# Patient Record
Sex: FEMALE | Race: WHITE
[De-identification: names, ages, dates, MRNs, and addresses within clinical notes are randomized per-mention and may not be internally consistent; named-entity substitution may affect disease eponyms.]

---

## 2019-03-11 ENCOUNTER — HOSPITAL ENCOUNTER (OUTPATIENT)
Age: 69
End: 2019-03-11
Payer: MEDICARE

## 2019-03-11 DIAGNOSIS — M75.102: Primary | ICD-10-CM

## 2019-03-11 PROCEDURE — 73030 X-RAY EXAM OF SHOULDER: CPT

## 2022-01-25 ENCOUNTER — HOSPITAL ENCOUNTER (OUTPATIENT)
Age: 72
End: 2022-01-25
Payer: MEDICARE

## 2022-01-25 DIAGNOSIS — Z20.822: Primary | ICD-10-CM

## 2022-01-25 PROCEDURE — U0005 INFEC AGEN DETEC AMPLI PROBE: HCPCS

## 2022-01-25 PROCEDURE — U0003 INFECTIOUS AGENT DETECTION BY NUCLEIC ACID (DNA OR RNA); SEVERE ACUTE RESPIRATORY SYNDROME CORONAVIRUS 2 (SARS-COV-2) (CORONAVIRUS DISEASE [COVID-19]), AMPLIFIED PROBE TECHNIQUE, MAKING USE OF HIGH THROUGHPUT TECHNOLOGIES AS DESCRIBED BY CMS-2020-01-R: HCPCS

## 2022-01-25 PROCEDURE — C9803 HOPD COVID-19 SPEC COLLECT: HCPCS

## 2022-06-10 ENCOUNTER — HOSPITAL ENCOUNTER (EMERGENCY)
Age: 72
Discharge: HOME | End: 2022-06-10
Payer: MEDICARE

## 2022-06-10 VITALS
HEART RATE: 67 BPM | BODY MASS INDEX: 26.9 KG/M2 | DIASTOLIC BLOOD PRESSURE: 57 MMHG | SYSTOLIC BLOOD PRESSURE: 147 MMHG | TEMPERATURE: 98.06 F | OXYGEN SATURATION: 97 % | RESPIRATION RATE: 18 BRPM

## 2022-06-10 VITALS
OXYGEN SATURATION: 97 % | DIASTOLIC BLOOD PRESSURE: 57 MMHG | RESPIRATION RATE: 18 BRPM | TEMPERATURE: 98 F | HEART RATE: 67 BPM | SYSTOLIC BLOOD PRESSURE: 147 MMHG

## 2022-06-10 DIAGNOSIS — S61.232A: ICD-10-CM

## 2022-06-10 DIAGNOSIS — Z23: ICD-10-CM

## 2022-06-10 PROCEDURE — 99283 EMERGENCY DEPT VISIT LOW MDM: CPT

## 2022-06-10 PROCEDURE — 90471 IMMUNIZATION ADMIN: CPT

## 2022-06-10 PROCEDURE — 99212 OFFICE O/P EST SF 10 MIN: CPT

## 2022-06-10 PROCEDURE — 90715 TDAP VACCINE 7 YRS/> IM: CPT

## 2022-06-10 PROCEDURE — G0463 HOSPITAL OUTPT CLINIC VISIT: HCPCS

## 2022-09-23 ENCOUNTER — OFFICE VISIT (OUTPATIENT)
Dept: FAMILY MEDICINE CLINIC | Facility: CLINIC | Age: 72
End: 2022-09-23

## 2022-09-23 VITALS
DIASTOLIC BLOOD PRESSURE: 80 MMHG | HEART RATE: 69 BPM | BODY MASS INDEX: 27.55 KG/M2 | WEIGHT: 171.4 LBS | SYSTOLIC BLOOD PRESSURE: 130 MMHG | RESPIRATION RATE: 20 BRPM | HEIGHT: 66 IN | TEMPERATURE: 97.8 F | OXYGEN SATURATION: 98 %

## 2022-09-23 DIAGNOSIS — Z00.00 MEDICARE ANNUAL WELLNESS VISIT, SUBSEQUENT: Primary | ICD-10-CM

## 2022-09-23 DIAGNOSIS — Z12.11 COLON CANCER SCREENING: ICD-10-CM

## 2022-09-23 DIAGNOSIS — Z83.3 FAMILY HISTORY OF DIABETES MELLITUS: ICD-10-CM

## 2022-09-23 DIAGNOSIS — Z83.49 FAMILY HISTORY OF THYROID DISEASE IN FATHER: ICD-10-CM

## 2022-09-23 DIAGNOSIS — Z13.220 LIPID SCREENING: ICD-10-CM

## 2022-09-23 DIAGNOSIS — Z12.31 ENCOUNTER FOR SCREENING MAMMOGRAM FOR MALIGNANT NEOPLASM OF BREAST: ICD-10-CM

## 2022-09-23 PROCEDURE — 99397 PER PM REEVAL EST PAT 65+ YR: CPT | Performed by: FAMILY MEDICINE

## 2022-09-23 PROCEDURE — 1170F FXNL STATUS ASSESSED: CPT | Performed by: FAMILY MEDICINE

## 2022-09-23 PROCEDURE — G0439 PPPS, SUBSEQ VISIT: HCPCS | Performed by: FAMILY MEDICINE

## 2022-09-23 NOTE — PROGRESS NOTES
The ABCs of the Annual Wellness Visit  Subsequent Medicare Wellness Visit    Chief Complaint   Patient presents with   • NP / establish PCP    • Medicare Wellness-subsequent      Subjective    History of Present Illness:  Sarah Beth Mclain is a 72 y.o. female who presents for a Subsequent Medicare Wellness Visit.    The following portions of the patient's history were reviewed and   updated as appropriate: allergies, current medications, past family history, past medical history, past social history, past surgical history and problem list.    Compared to one year ago, the patient feels her physical   health is the same.    Compared to one year ago, the patient feels her mental   health is the same.    Recent Hospitalizations:  She was not admitted to the hospital during the last year.       Current Medical Providers:  Patient Care Team:  Aguilar Charlton MD as PCP - General (Family Medicine)    No outpatient medications prior to visit.     No facility-administered medications prior to visit.       No opioid medication identified on active medication list. I have reviewed chart for other potential  high risk medication/s and harmful drug interactions in the elderly.          Aspirin is not on active medication list.  Aspirin use is not indicated based on review of current medical condition/s. Risk of harm outweighs potential benefits.  .    There is no problem list on file for this patient.    Advance Care Planning  Advance Directive is not on file.  ACP discussion was held with the patient during this visit. Patient has an advance directive (not in EMR), copy requested.    Review of Systems   Constitutional: Negative.    HENT: Negative.    Eyes: Negative.    Respiratory: Negative.    Cardiovascular: Negative.    Gastrointestinal: Negative.    Endocrine: Negative.    Genitourinary: Negative.    Musculoskeletal: Negative.    Skin: Negative.    Allergic/Immunologic: Negative.    Neurological: Negative.   "  Hematological: Negative.    Psychiatric/Behavioral: Negative.         Objective    Vitals:    09/23/22 1422   BP: 130/80   Pulse: 69   Resp: 20   Temp: 97.8 °F (36.6 °C)   SpO2: 98%   Weight: 77.7 kg (171 lb 6.4 oz)   Height: 168.3 cm (66.25\")     Estimated body mass index is 27.46 kg/m² as calculated from the following:    Height as of this encounter: 168.3 cm (66.25\").    Weight as of this encounter: 77.7 kg (171 lb 6.4 oz).    BMI is >= 25 and <30. (Overweight) The following options were offered after discussion;: nutrition counseling/recommendations      Does the patient have evidence of cognitive impairment? No    Physical Exam  Constitutional:       Appearance: Normal appearance.   HENT:      Head: Normocephalic and atraumatic.      Right Ear: Tympanic membrane and ear canal normal.      Left Ear: Tympanic membrane and ear canal normal.      Nose: Nose normal.      Mouth/Throat:      Mouth: Mucous membranes are moist.      Pharynx: Oropharynx is clear.   Eyes:      Conjunctiva/sclera: Conjunctivae normal.   Cardiovascular:      Rate and Rhythm: Normal rate and regular rhythm.      Heart sounds: Normal heart sounds. No murmur heard.  Pulmonary:      Effort: Pulmonary effort is normal. No respiratory distress.      Breath sounds: Normal breath sounds.   Abdominal:      General: Abdomen is flat. Bowel sounds are normal. There is no distension.      Palpations: Abdomen is soft.      Tenderness: There is no abdominal tenderness.   Musculoskeletal:         General: Normal range of motion.      Cervical back: Normal range of motion and neck supple. No tenderness.   Lymphadenopathy:      Cervical: No cervical adenopathy.   Skin:     General: Skin is warm and dry.   Neurological:      General: No focal deficit present.      Mental Status: She is alert.   Psychiatric:         Mood and Affect: Mood normal.                 HEALTH RISK ASSESSMENT    Smoking Status:  Social History     Tobacco Use   Smoking Status Never " Smoker   Smokeless Tobacco Never Used     Alcohol Consumption:  Social History     Substance and Sexual Activity   Alcohol Use Yes   • Alcohol/week: 1.0 standard drink   • Types: 1 Glasses of wine per week     Fall Risk Screen:    REBEKAH Fall Risk Assessment was completed, and patient is at LOW risk for falls.Assessment completed on:9/23/2022    Depression Screening:  PHQ-2/PHQ-9 Depression Screening 9/23/2022   Little Interest or Pleasure in Doing Things 0-->not at all   Feeling Down, Depressed or Hopeless 0-->not at all   PHQ-9: Brief Depression Severity Measure Score 0       Health Habits and Functional and Cognitive Screening:  Functional & Cognitive Status 9/23/2022   Do you have difficulty preparing food and eating? No   Do you have difficulty bathing yourself, getting dressed or grooming yourself? No   Do you have difficulty using the toilet? No   Do you have difficulty moving around from place to place? No   Do you have trouble with steps or getting out of a bed or a chair? No   Current Diet Well Balanced Diet   Dental Exam Up to date   Eye Exam Up to date   Exercise (times per week) 5 times per week   Current Exercises Include Walking   Do you need help using the phone?  No   Are you deaf or do you have serious difficulty hearing?  No   Do you need help with transportation? No   Do you need help shopping? No   Do you need help preparing meals?  No   Do you need help with housework?  No   Do you need help with laundry? No   Do you need help taking your medications? No   Do you need help managing money? No   Do you ever drive or ride in a car without wearing a seat belt? No   Have you felt unusual stress, anger or loneliness in the last month? No   Who do you live with? Alone   If you need help, do you have trouble finding someone available to you? No   Do you have difficulty concentrating, remembering or making decisions? No       Age-appropriate Screening Schedule:  Refer to the list below for future  screening recommendations based on patient's age, sex and/or medical conditions. Orders for these recommended tests are listed in the plan section. The patient has been provided with a written plan.    Health Maintenance   Topic Date Due   • DXA SCAN  Never done   • INFLUENZA VACCINE  10/01/2022   • MAMMOGRAM  11/01/2023   • TDAP/TD VACCINES (2 - Td or Tdap) 06/10/2032   • ZOSTER VACCINE  Completed              Assessment & Plan   CMS Preventative Services Quick Reference  Risk Factors Identified During Encounter  Immunizations Discussed/Encouraged (specific Immunizations; Influenza and COVID19  The above risks/problems have been discussed with the patient.  Follow up actions/plans if indicated are seen below in the Assessment/Plan Section.  Pertinent information has been shared with the patient in the After Visit Summary.    Diagnoses and all orders for this visit:    1. Medicare annual wellness visit, subsequent (Primary)    2. Colon cancer screening  -     Cologuard - Stool, Per Rectum; Future    3. Encounter for screening mammogram for malignant neoplasm of breast  -     Mammo Screening Digital Tomosynthesis Bilateral With CAD; Future    4. Lipid screening  -     Lipid Panel    5. Family history of diabetes mellitus  -     Basic Metabolic Panel    6. Family history of thyroid disease in father  -     TSH Rfx On Abnormal To Free T4    Personalized care plan:  1.  Flu and divalent COVID booster should be obtained this fall  2.  CRC screening with Cologuard has been ordered  3.  Mammogram ordered for November  4.  Screening lipid ordered along with BMP to obtain glucose and TSH due to family history of diabetes and hypothyroidism respectively    Follow Up:   Return in about 1 year (around 9/23/2023) for Annual, Medicare Wellness.     An After Visit Summary and PPPS were made available to the patient.

## 2022-09-24 LAB
BUN SERPL-MCNC: 19 MG/DL (ref 8–27)
BUN/CREAT SERPL: 27 (ref 12–28)
CALCIUM SERPL-MCNC: 9.7 MG/DL (ref 8.7–10.3)
CHLORIDE SERPL-SCNC: 101 MMOL/L (ref 96–106)
CHOLEST SERPL-MCNC: 169 MG/DL (ref 100–199)
CO2 SERPL-SCNC: 22 MMOL/L (ref 20–29)
CREAT SERPL-MCNC: 0.7 MG/DL (ref 0.57–1)
EGFRCR SERPLBLD CKD-EPI 2021: 92 ML/MIN/1.73
GLUCOSE SERPL-MCNC: 87 MG/DL (ref 65–99)
HDLC SERPL-MCNC: 49 MG/DL
LDLC SERPL CALC-MCNC: 94 MG/DL (ref 0–99)
POTASSIUM SERPL-SCNC: 4.2 MMOL/L (ref 3.5–5.2)
SODIUM SERPL-SCNC: 139 MMOL/L (ref 134–144)
TRIGL SERPL-MCNC: 149 MG/DL (ref 0–149)
TSH SERPL DL<=0.005 MIU/L-ACNC: 3.42 UIU/ML (ref 0.45–4.5)
VLDLC SERPL CALC-MCNC: 26 MG/DL (ref 5–40)

## 2022-10-25 ENCOUNTER — OFFICE VISIT (OUTPATIENT)
Dept: FAMILY MEDICINE CLINIC | Facility: CLINIC | Age: 72
End: 2022-10-25

## 2022-10-25 VITALS
OXYGEN SATURATION: 98 % | WEIGHT: 170.4 LBS | BODY MASS INDEX: 27.3 KG/M2 | HEART RATE: 77 BPM | TEMPERATURE: 97.5 F | DIASTOLIC BLOOD PRESSURE: 70 MMHG | SYSTOLIC BLOOD PRESSURE: 130 MMHG

## 2022-10-25 DIAGNOSIS — H69.82 ACUTE DYSFUNCTION OF LEFT EUSTACHIAN TUBE: Primary | ICD-10-CM

## 2022-10-25 PROCEDURE — 99212 OFFICE O/P EST SF 10 MIN: CPT | Performed by: FAMILY MEDICINE
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
## 2022-12-06 ENCOUNTER — TELEPHONE (OUTPATIENT)
Dept: FAMILY MEDICINE CLINIC | Facility: CLINIC | Age: 72
End: 2022-12-06

## 2022-12-06 NOTE — TELEPHONE ENCOUNTER
[facility] ([phone], reference #[id number]) needs a diagnose code. They would like a call back.

## 2023-01-03 ENCOUNTER — HOSPITAL ENCOUNTER (EMERGENCY)
Age: 73
Discharge: HOME | End: 2023-01-03
Payer: MEDICARE

## 2023-01-03 VITALS
SYSTOLIC BLOOD PRESSURE: 139 MMHG | TEMPERATURE: 98.78 F | RESPIRATION RATE: 16 BRPM | DIASTOLIC BLOOD PRESSURE: 83 MMHG | HEART RATE: 66 BPM | OXYGEN SATURATION: 96 %

## 2023-01-03 VITALS
TEMPERATURE: 98.8 F | SYSTOLIC BLOOD PRESSURE: 157 MMHG | OXYGEN SATURATION: 99 % | DIASTOLIC BLOOD PRESSURE: 77 MMHG | RESPIRATION RATE: 16 BRPM | HEART RATE: 88 BPM

## 2023-01-03 VITALS — BODY MASS INDEX: 25.8 KG/M2

## 2023-01-03 DIAGNOSIS — R10.10: ICD-10-CM

## 2023-01-03 DIAGNOSIS — R10.13: ICD-10-CM

## 2023-01-03 DIAGNOSIS — R07.89: ICD-10-CM

## 2023-01-03 DIAGNOSIS — K29.70: Primary | ICD-10-CM

## 2023-01-03 LAB
ALBUMIN LEVEL: 4.8 G/DL (ref 3.5–5)
ALBUMIN/GLOB SERPL: 1.5 {RATIO} (ref 1.1–1.8)
ALP ISO SERPL-ACNC: 64 U/L (ref 38–126)
ALT SERPLBLD-CCNC: 22 U/L (ref 12–78)
ANION GAP SERPL CALC-SCNC: 13.8 MEQ/L (ref 5–15)
AST SERPL QL: 29 U/L (ref 14–36)
BILIRUBIN,TOTAL: 0.5 MG/DL (ref 0.2–1.3)
BUN SERPL-MCNC: 22 MG/DL (ref 7–17)
CALCIUM SPEC-MCNC: 9.1 MG/DL (ref 8.4–10.2)
CHLORIDE SPEC-SCNC: 105 MMOL/L (ref 98–107)
CO2 SERPL-SCNC: 25 MMOL/L (ref 22–30)
COLOR UR: YELLOW
CREAT BLD-SCNC: 0.6 MG/DL (ref 0.52–1.04)
CREATININE CLEARANCE ESTIMATED: 59 ML/MIN (ref 50–200)
ESTIMATED GLOMERULAR FILT RATE: 99 ML/MIN (ref 60–?)
GFR (AFRICAN AMERICAN): 119 ML/MIN (ref 60–?)
GLOBULIN SER CALC-MCNC: 3.2 G/DL (ref 1.3–3.2)
GLUCOSE: 142 MG/DL (ref 74–100)
HCT VFR BLD CALC: 45.2 % (ref 37–47)
HGB BLD-MCNC: 15 G/DL (ref 12.2–16.2)
KETONES UR STRIP.AUTO-MCNC: (no result) MG/DL
LIPASE: 146 U/L (ref 23–300)
MCHC RBC-ENTMCNC: 33.2 G/DL (ref 31.8–35.4)
MCV RBC: 85.5 FL (ref 81–99)
MEAN CORPUSCULAR HEMOGLOBIN: 28.4 PG (ref 27–31.2)
MICRO URNS: (no result)
PH UR: 6 [PH] (ref 5–8.5)
PLATELET # BLD: 355 K/MM3 (ref 142–424)
POTASSIUM: 3.8 MMOL/L (ref 3.5–5.1)
PROT SERPL-MCNC: 8 G/DL (ref 6.3–8.2)
RBC # BLD AUTO: 5.28 M/MM3 (ref 4.2–5.4)
SODIUM SPEC-SCNC: 140 MMOL/L (ref 136–145)
SP GR UR: 1.02 (ref 1–1.03)
TROPONIN I: < 0.01 NG/ML (ref 0–0.03)
UROBILINOGEN UR QL: 0.2 EU/DL
WBC # BLD AUTO: 12.7 K/MM3 (ref 4.8–10.8)
WBC # UR: (no result) #/HPF (ref 0–3)

## 2023-01-03 PROCEDURE — 85025 COMPLETE CBC W/AUTO DIFF WBC: CPT

## 2023-01-03 PROCEDURE — 83690 ASSAY OF LIPASE: CPT

## 2023-01-03 PROCEDURE — 74177 CT ABD & PELVIS W/CONTRAST: CPT

## 2023-01-03 PROCEDURE — 96374 THER/PROPH/DIAG INJ IV PUSH: CPT

## 2023-01-03 PROCEDURE — 84484 ASSAY OF TROPONIN QUANT: CPT

## 2023-01-03 PROCEDURE — 76705 ECHO EXAM OF ABDOMEN: CPT

## 2023-01-03 PROCEDURE — 80053 COMPREHEN METABOLIC PANEL: CPT

## 2023-01-03 PROCEDURE — 99285 EMERGENCY DEPT VISIT HI MDM: CPT

## 2023-01-03 PROCEDURE — 83605 ASSAY OF LACTIC ACID: CPT

## 2023-01-03 PROCEDURE — 81001 URINALYSIS AUTO W/SCOPE: CPT

## 2023-01-03 PROCEDURE — 96361 HYDRATE IV INFUSION ADD-ON: CPT

## 2023-01-03 PROCEDURE — 93005 ELECTROCARDIOGRAM TRACING: CPT

## 2023-01-03 PROCEDURE — 96375 TX/PRO/DX INJ NEW DRUG ADDON: CPT

## 2023-01-03 NOTE — PC.NURSE
checked on pt at this time. pt states no needs at this time, lights turned off in room for comfort, pt states is tired.

## 2023-01-19 ENCOUNTER — OFFICE VISIT (OUTPATIENT)
Dept: FAMILY MEDICINE CLINIC | Facility: CLINIC | Age: 73
End: 2023-01-19
Payer: MEDICARE

## 2023-01-19 VITALS
HEART RATE: 78 BPM | SYSTOLIC BLOOD PRESSURE: 146 MMHG | DIASTOLIC BLOOD PRESSURE: 82 MMHG | WEIGHT: 172 LBS | OXYGEN SATURATION: 98 % | BODY MASS INDEX: 27 KG/M2 | TEMPERATURE: 97.5 F | HEIGHT: 67 IN | RESPIRATION RATE: 18 BRPM

## 2023-01-19 DIAGNOSIS — R73.9 ELEVATED BLOOD SUGAR LEVEL: ICD-10-CM

## 2023-01-19 DIAGNOSIS — K21.9 GASTROESOPHAGEAL REFLUX DISEASE WITHOUT ESOPHAGITIS: Primary | ICD-10-CM

## 2023-01-19 LAB — GLUCOSE BLDC GLUCOMTR-MCNC: 97 MG/DL (ref 70–130)

## 2023-01-19 PROCEDURE — 99213 OFFICE O/P EST LOW 20 MIN: CPT | Performed by: FAMILY MEDICINE

## 2023-01-19 PROCEDURE — 82962 GLUCOSE BLOOD TEST: CPT | Performed by: FAMILY MEDICINE

## 2023-01-19 RX ORDER — PANTOPRAZOLE SODIUM 40 MG/1
40 TABLET, DELAYED RELEASE ORAL DAILY
COMMUNITY
Start: 2023-01-03 | End: 2023-01-19 | Stop reason: SDUPTHER

## 2023-01-19 RX ORDER — PANTOPRAZOLE SODIUM 40 MG/1
40 TABLET, DELAYED RELEASE ORAL DAILY
Qty: 30 TABLET | Refills: 8 | Status: SHIPPED | OUTPATIENT
Start: 2023-01-19

## 2023-01-19 NOTE — ASSESSMENT & PLAN NOTE
Pantoprazole daily for 3 months then may change to as needed use.  If symptoms recur while on maintenance therapy she would need EGD

## 2023-01-19 NOTE — PROGRESS NOTES
"Chief Complaint   Patient presents with   • ER f/u     Was in the ER on 3rd. Dx Gastritis       Subjective      Sarah Beth Mclain is a 72 y.o. who presents for follow up of an ER visit on 1/3/23 when she presented with epigastric discomfort and though she was having an MI. She also reports she was having frequent nocturnal awakenings with \"bile in my throat\". Symptoms resolved with a a GI cocktail. She ruled out for MI. She is now taking protonix and symptoms have resolved.  She tells me she was told she had elevated blood sugar of 140 in the emergency room.  That was a fasting result.  Patient's white blood cell count is also apparently slightly elevated.    Objective   Vital Signs:  /82   Pulse 78   Temp 97.5 °F (36.4 °C)   Resp 18   Ht 170.2 cm (67\")   Wt 78 kg (172 lb)   SpO2 98%   BMI 26.94 kg/m²     Physical Exam  Vitals reviewed.   Constitutional:       Appearance: Normal appearance.   Neurological:      Mental Status: She is alert.          Result Review   The following data was reviewed by: Aguilar Charlton MD on 01/19/2023:  Common labs    Common Labs 9/23/22 9/23/22    1459 1459   Glucose  87   BUN  19   Creatinine  0.70   Sodium  139   Potassium  4.2   Chloride  101   Calcium  9.7   Total Cholesterol 169    Triglycerides 149    HDL Cholesterol 49    LDL Cholesterol  94       Comments are available for some flowsheets but are not being displayed.           Data reviewed: Glucose 97              Assessment and Plan  Diagnoses and all orders for this visit:    1. Gastroesophageal reflux disease without esophagitis (Primary)  Assessment & Plan:  Pantoprazole daily for 3 months then may change to as needed use.  If symptoms recur while on maintenance therapy she would need EGD    Orders:  -     pantoprazole (PROTONIX) 40 MG EC tablet; Take 1 tablet by mouth Daily.  Dispense: 30 tablet; Refill: 8    2. Elevated blood sugar level  Comments:  Elevated blood sugar in ER likely " stress-induced.  Orders:  -     POC Glucose          Follow Up  No follow-ups on file.  Patient was given instructions and counseling regarding her condition or for health maintenance advice. Please see specific information pulled into the AVS if appropriate.

## 2023-02-01 ENCOUNTER — TELEPHONE (OUTPATIENT)
Dept: FAMILY MEDICINE CLINIC | Facility: CLINIC | Age: 73
End: 2023-02-01
Payer: MEDICARE

## 2023-02-01 NOTE — TELEPHONE ENCOUNTER
" Informed pt about taking Mucinex, Delsym and Tylenol vs Ibuprofen and just treating her symptoms as they arise. She stated, \"she has no underlying health issues other than her age.\" She was informed to go to the ER if any chest pain or difficulty breathing develops. She voiced understanding.     Pt want to know if you think she needs Paxlovide?  "

## 2023-02-01 NOTE — TELEPHONE ENCOUNTER
Caller: Sarah Beth Mclain    Relationship to patient: Self    Best call back number: [phone]    Date of positive COVID19 test: 2/1/23    COVID19 symptoms: SINUS DRAINAGE, COUGH, SORE THROAT     Additional information or concerns: PATIENT IS ASKING FOR MEDICATION     What is the patients preferred pharmacy: [facility] IN [place]

## 2023-02-01 NOTE — TELEPHONE ENCOUNTER
Because she is in good health, vaccinated  and symptoms are mild I think we can hold off on medication. I would like an update on Thursday. We can always start medicine if she begins feeling worse

## 2023-02-03 ENCOUNTER — TELEPHONE (OUTPATIENT)
Dept: FAMILY MEDICINE CLINIC | Facility: CLINIC | Age: 73
End: 2023-02-03
Payer: MEDICARE

## 2023-02-03 NOTE — TELEPHONE ENCOUNTER
Caller: Sarah Beth Mclain    Relationship: Self    Best call back number: [phone]    Who are you requesting to speak with (clinical staff, provider,  specific staff member): CLINICAL     What was the call regarding: COVID UPDATE     Do you require a callback: YES

## 2023-09-25 ENCOUNTER — OFFICE VISIT (OUTPATIENT)
Dept: FAMILY MEDICINE CLINIC | Facility: CLINIC | Age: 73
End: 2023-09-25

## 2023-09-25 VITALS
HEIGHT: 67 IN | TEMPERATURE: 98 F | BODY MASS INDEX: 27.44 KG/M2 | WEIGHT: 174.8 LBS | OXYGEN SATURATION: 98 % | DIASTOLIC BLOOD PRESSURE: 62 MMHG | SYSTOLIC BLOOD PRESSURE: 148 MMHG | HEART RATE: 61 BPM | RESPIRATION RATE: 18 BRPM

## 2023-09-25 DIAGNOSIS — Z00.00 ANNUAL PHYSICAL EXAM: ICD-10-CM

## 2023-09-25 DIAGNOSIS — Z23 NEED FOR VACCINATION: ICD-10-CM

## 2023-09-25 DIAGNOSIS — K21.9 GASTROESOPHAGEAL REFLUX DISEASE WITHOUT ESOPHAGITIS: ICD-10-CM

## 2023-09-25 DIAGNOSIS — Z00.00 MEDICARE ANNUAL WELLNESS VISIT, SUBSEQUENT: Primary | ICD-10-CM

## 2023-09-25 PROCEDURE — 90662 IIV NO PRSV INCREASED AG IM: CPT | Performed by: FAMILY MEDICINE

## 2023-09-25 PROCEDURE — 1170F FXNL STATUS ASSESSED: CPT | Performed by: FAMILY MEDICINE

## 2023-09-25 PROCEDURE — G0008 ADMIN INFLUENZA VIRUS VAC: HCPCS | Performed by: FAMILY MEDICINE

## 2023-09-25 PROCEDURE — 1160F RVW MEDS BY RX/DR IN RCRD: CPT | Performed by: FAMILY MEDICINE

## 2023-09-25 PROCEDURE — 99397 PER PM REEVAL EST PAT 65+ YR: CPT | Performed by: FAMILY MEDICINE

## 2023-09-25 PROCEDURE — G0439 PPPS, SUBSEQ VISIT: HCPCS | Performed by: FAMILY MEDICINE

## 2023-09-25 PROCEDURE — 1159F MED LIST DOCD IN RCRD: CPT | Performed by: FAMILY MEDICINE

## 2023-09-25 RX ORDER — PANTOPRAZOLE SODIUM 40 MG/1
40 TABLET, DELAYED RELEASE ORAL DAILY
Qty: 30 TABLET | Refills: 11 | Status: SHIPPED | OUTPATIENT
Start: 2023-09-25

## 2023-09-25 NOTE — PROGRESS NOTES
The ABCs of the Annual Wellness Visit  Subsequent Medicare Wellness Visit    Subjective      Sarah Beht Mclain is a 73 y.o. female who presents for a Subsequent Medicare Wellness Visit and annual physical.    The following portions of the patient's history were reviewed and   updated as appropriate: allergies, current medications, past family history, past medical history, past social history, past surgical history, and problem list.    Compared to one year ago, the patient feels her physical   health is the same.    Compared to one year ago, the patient feels her mental   health is the same.    Recent Hospitalizations:  She was not admitted to the hospital during the last year.       Current Medical Providers:  Patient Care Team:  Aguilar Charlton MD as PCP - General (Family Medicine)    Outpatient Medications Prior to Visit   Medication Sig Dispense Refill    pantoprazole (PROTONIX) 40 MG EC tablet Take 1 tablet by mouth Daily. 30 tablet 8     No facility-administered medications prior to visit.       No opioid medication identified on active medication list. I have reviewed chart for other potential  high risk medication/s and harmful drug interactions in the elderly.        Aspirin is not on active medication list.  Aspirin use is not indicated based on review of current medical condition/s. Risk of harm outweighs potential benefits.  .    Patient Active Problem List   Diagnosis    Gastroesophageal reflux disease without esophagitis     Advance Care Planning   Advance Care Planning     Advance Directive is on file.  ACP discussion was held with the patient during this visit. Patient has an advance directive in EMR which is still valid.        Review of Systems   Constitutional: Negative.    HENT: Negative.     Eyes: Negative.    Respiratory: Negative.     Cardiovascular: Negative.    Gastrointestinal:         Occasional heartburn   Endocrine: Negative.    Genitourinary: Negative.    Musculoskeletal:  "Negative.    Skin: Negative.    Allergic/Immunologic: Negative.    Neurological: Negative.    Hematological: Negative.    Psychiatric/Behavioral: Negative.           Objective    Vitals:    09/25/23 1004   BP: 148/62   Pulse: 61   Resp: 18   Temp: 98 °F (36.7 °C)   SpO2: 98%   Weight: 79.3 kg (174 lb 12.8 oz)   Height: 170.2 cm (67\")   PainSc: 0-No pain     Estimated body mass index is 27.38 kg/m² as calculated from the following:    Height as of this encounter: 170.2 cm (67\").    Weight as of this encounter: 79.3 kg (174 lb 12.8 oz).    Physical Exam  Constitutional:       General: She is not in acute distress.     Appearance: Normal appearance. She is not ill-appearing.   HENT:      Head: Normocephalic and atraumatic.      Right Ear: Tympanic membrane and ear canal normal.      Left Ear: Tympanic membrane and ear canal normal.      Nose: Nose normal.      Mouth/Throat:      Mouth: Mucous membranes are moist.      Pharynx: Oropharynx is clear. No posterior oropharyngeal erythema.   Eyes:      Extraocular Movements: Extraocular movements intact.      Conjunctiva/sclera: Conjunctivae normal.      Pupils: Pupils are equal, round, and reactive to light.   Cardiovascular:      Rate and Rhythm: Normal rate and regular rhythm.      Pulses: Normal pulses.      Heart sounds: Normal heart sounds.   Pulmonary:      Effort: Pulmonary effort is normal. No respiratory distress.      Breath sounds: Normal breath sounds.   Abdominal:      General: Abdomen is flat. Bowel sounds are normal.      Palpations: Abdomen is soft.      Tenderness: There is no abdominal tenderness.   Musculoskeletal:         General: Normal range of motion.      Cervical back: Normal range of motion and neck supple.   Lymphadenopathy:      Cervical: No cervical adenopathy.   Skin:     General: Skin is warm and dry.      Capillary Refill: Capillary refill takes less than 2 seconds.   Neurological:      General: No focal deficit present.      Mental Status: " She is alert and oriented to person, place, and time. Mental status is at baseline.      Cranial Nerves: No cranial nerve deficit.      Sensory: No sensory deficit.      Motor: No weakness.   Psychiatric:         Mood and Affect: Mood normal.         Behavior: Behavior normal.         Thought Content: Thought content normal.         Judgment: Judgment normal.              Does the patient have evidence of cognitive impairment?   No            HEALTH RISK ASSESSMENT    Smoking Status:  Social History     Tobacco Use   Smoking Status Never   Smokeless Tobacco Never     Alcohol Consumption:  Social History     Substance and Sexual Activity   Alcohol Use Yes    Alcohol/week: 1.0 standard drink    Types: 1 Glasses of wine per week     Fall Risk Screen:    STEADI Fall Risk Assessment was completed, and patient is at LOW risk for falls.Assessment completed on:2023    Depression Screenin/25/2023    10:11 AM   PHQ-2/PHQ-9 Depression Screening   Little Interest or Pleasure in Doing Things 0-->not at all   Feeling Down, Depressed or Hopeless 0-->not at all   PHQ-9: Brief Depression Severity Measure Score 0       Health Habits and Functional and Cognitive Screenin/25/2023    10:09 AM   Functional & Cognitive Status   Do you have difficulty preparing food and eating? No   Do you have difficulty bathing yourself, getting dressed or grooming yourself? No   Do you have difficulty using the toilet? No   Do you have difficulty moving around from place to place? No   Do you have trouble with steps or getting out of a bed or a chair? No   Current Diet Well Balanced Diet   Dental Exam Up to date   Eye Exam Up to date   Exercise (times per week) 7 times per week   Current Exercises Include Yard Work   Do you need help using the phone?  No   Are you deaf or do you have serious difficulty hearing?  No   Do you need help to go to places out of walking distance? No   Do you need help shopping? No   Do you need help  preparing meals?  No   Do you need help with housework?  No   Do you need help with laundry? No   Do you need help taking your medications? No   Do you need help managing money? No   Do you ever drive or ride in a car without wearing a seat belt? No   Have you felt unusual stress, anger or loneliness in the last month? No   Who do you live with? Other   If you need help, do you have trouble finding someone available to you? No   Have you been bothered in the last four weeks by sexual problems? No   Do you have difficulty concentrating, remembering or making decisions? No       Age-appropriate Screening Schedule:  Refer to the list below for future screening recommendations based on patient's age, sex and/or medical conditions. Orders for these recommended tests are listed in the plan section. The patient has been provided with a written plan.    Health Maintenance   Topic Date Due    Pneumococcal Vaccine 65+ (1 - PCV) 04/13/2016    DXA SCAN  01/02/2019    HEPATITIS C SCREENING  Never done    COVID-19 Vaccine (5 - Pfizer series) 10/19/2022    BMI FOLLOWUP  09/23/2023    INFLUENZA VACCINE  10/01/2023    ANNUAL WELLNESS VISIT  09/25/2024    MAMMOGRAM  11/04/2024    COLORECTAL CANCER SCREENING  10/18/2025    TDAP/TD VACCINES (2 - Td or Tdap) 06/10/2032    ZOSTER VACCINE  Completed                  CMS Preventative Services Quick Reference  Risk Factors Identified During Encounter:    Immunizations Discussed/Encouraged: Influenza, COVID19, and RSV  Dental Screening Recommended  Vision Screening Recommended    The above risks/problems have been discussed with the patient.  Pertinent information has been shared with the patient in the After Visit Summary.    Diagnoses and all orders for this visit:    1. Medicare annual wellness visit, subsequent (Primary)    2. Gastroesophageal reflux disease without esophagitis  -     pantoprazole (PROTONIX) 40 MG EC tablet; Take 1 tablet by mouth Daily.  Dispense: 30 tablet; Refill:  11    3. Need for vaccination  -     Fluzone High-Dose 65+yrs (2310-7142)    4. Annual physical exam        Follow Up:   Next Medicare Wellness visit to be scheduled in 1 year.      An After Visit Summary and PPPS were made available to the patient.

## 2024-08-27 ENCOUNTER — TELEPHONE (OUTPATIENT)
Dept: FAMILY MEDICINE CLINIC | Facility: CLINIC | Age: 74
End: 2024-08-27
Payer: MEDICARE

## 2024-08-27 NOTE — TELEPHONE ENCOUNTER
"  Caller: Sarah Beth Mclain \"Pat\"    Relationship: Self    Best call back number: [phone]     What is the best time to reach you: ANYTIME    Who are you requesting to speak with (clinical staff, provider,  specific staff member): CLINICAL STAFF    What was the call regarding: THE PATIENT TESTED POSITIVE FOR COVID AND HER ONLY SYMPTOM IS A STUFFY NOSE. SHE WOULD LIKE TO KNOW WHAT SHE SHOULD DO OUTSIDE OF TAKING MUCINEX DM 12 HOUR EXTENDED RELEASE?    "

## 2024-08-27 NOTE — TELEPHONE ENCOUNTER
She can use an over-the-counter nasal spray for nasal congestion.  She would use this for about 3 days before stopping the medication.  She may also use Sudafed for nasal congestion

## 2024-09-30 ENCOUNTER — OFFICE VISIT (OUTPATIENT)
Dept: FAMILY MEDICINE CLINIC | Facility: CLINIC | Age: 74
End: 2024-09-30
Payer: MEDICARE

## 2024-09-30 VITALS
HEIGHT: 67 IN | TEMPERATURE: 97.1 F | HEART RATE: 59 BPM | BODY MASS INDEX: 26.24 KG/M2 | OXYGEN SATURATION: 98 % | RESPIRATION RATE: 20 BRPM | SYSTOLIC BLOOD PRESSURE: 115 MMHG | WEIGHT: 167.2 LBS | DIASTOLIC BLOOD PRESSURE: 72 MMHG

## 2024-09-30 DIAGNOSIS — K21.9 GASTROESOPHAGEAL REFLUX DISEASE WITHOUT ESOPHAGITIS: ICD-10-CM

## 2024-09-30 DIAGNOSIS — Z00.00 ANNUAL PHYSICAL EXAM: ICD-10-CM

## 2024-09-30 DIAGNOSIS — Z13.220 SCREENING FOR CHOLESTEROL LEVEL: ICD-10-CM

## 2024-09-30 DIAGNOSIS — Z13.1 SCREENING FOR DIABETES MELLITUS: ICD-10-CM

## 2024-09-30 DIAGNOSIS — Z00.00 MEDICARE ANNUAL WELLNESS VISIT, SUBSEQUENT: Primary | ICD-10-CM

## 2024-09-30 DIAGNOSIS — Z83.3 FAMILY HISTORY OF DIABETES MELLITUS: ICD-10-CM

## 2024-09-30 PROCEDURE — G0439 PPPS, SUBSEQ VISIT: HCPCS | Performed by: FAMILY MEDICINE

## 2024-09-30 PROCEDURE — 1126F AMNT PAIN NOTED NONE PRSNT: CPT | Performed by: FAMILY MEDICINE

## 2024-09-30 PROCEDURE — 1170F FXNL STATUS ASSESSED: CPT | Performed by: FAMILY MEDICINE

## 2024-09-30 PROCEDURE — 99397 PER PM REEVAL EST PAT 65+ YR: CPT | Performed by: FAMILY MEDICINE

## 2024-09-30 RX ORDER — PANTOPRAZOLE SODIUM 40 MG/1
40 TABLET, DELAYED RELEASE ORAL DAILY
Qty: 30 TABLET | Refills: 11 | Status: SHIPPED | OUTPATIENT
Start: 2024-09-30

## 2024-09-30 NOTE — PROGRESS NOTES
" Subjective   The ABCs of the Annual Wellness Visit  Medicare Wellness Visit      Sarah Beth Mclain is a 74 y.o. patient who presents for a Medicare Wellness Visit.    The following portions of the patient's history were reviewed and   updated as appropriate: allergies, current medications, past family history, past medical history, past social history, past surgical history, and problem list.    Compared to one year ago, the patient's physical   health is the same.  Compared to one year ago, the patient's mental   health is the same.    Recent Hospitalizations:  She was not admitted to the hospital during the last year.     Current Medical Providers:  Patient Care Team:  Aguilar Charlton MD as PCP - General (Family Medicine)    Outpatient Medications Prior to Visit   Medication Sig Dispense Refill    pantoprazole (PROTONIX) 40 MG EC tablet Take 1 tablet by mouth Daily. 30 tablet 11     No facility-administered medications prior to visit.     No opioid medication identified on active medication list. I have reviewed chart for other potential  high risk medication/s and harmful drug interactions in the elderly.      Aspirin is not on active medication list.  Aspirin use is not indicated based on review of current medical condition/s. Risk of harm outweighs potential benefits.  .    Patient Active Problem List   Diagnosis    Gastroesophageal reflux disease without esophagitis     Advance Care Planning Advance Directive is on file.  ACP discussion was held with the patient during this visit. Patient has an advance directive in EMR which is still valid.             Objective   Vitals:    09/30/24 0902   BP: 115/72   Pulse: 59   Resp: 20   Temp: 97.1 °F (36.2 °C)   SpO2: 98%   Weight: 75.8 kg (167 lb 3.2 oz)   Height: 170.2 cm (67\")   PainSc: 0-No pain       Estimated body mass index is 26.19 kg/m² as calculated from the following:    Height as of this encounter: 170.2 cm (67\").    Weight as of this encounter: 75.8 " kg (167 lb 3.2 oz).            Does the patient have evidence of cognitive impairment? No                                                                                                Health  Risk Assessment    Smoking Status:  Social History     Tobacco Use   Smoking Status Never   Smokeless Tobacco Never     Alcohol Consumption:  Social History     Substance and Sexual Activity   Alcohol Use Yes    Alcohol/week: 1.0 standard drink of alcohol    Types: 1 Glasses of wine per week       Fall Risk Screen  JUDEADI Fall Risk Assessment was completed, and patient is at LOW risk for falls.Assessment completed on:2024    Depression Screenin/30/2024     9:07 AM   PHQ-2/PHQ-9 Depression Screening   Little Interest or Pleasure in Doing Things 0-->not at all   Feeling Down, Depressed or Hopeless 0-->not at all   PHQ-9: Brief Depression Severity Measure Score 0     Health Habits and Functional and Cognitive Screenin/30/2024     9:06 AM   Functional & Cognitive Status   Do you have difficulty preparing food and eating? No   Do you have difficulty bathing yourself, getting dressed or grooming yourself? No   Do you have difficulty using the toilet? No   Do you have difficulty moving around from place to place? No   Do you have trouble with steps or getting out of a bed or a chair? No   Current Diet Well Balanced Diet   Dental Exam Up to date   Eye Exam Up to date   Exercise (times per week) 7 times per week   Current Exercises Include Walking   Do you need help using the phone?  No   Are you deaf or do you have serious difficulty hearing?  No   Do you need help to go to places out of walking distance? No   Do you need help shopping? No   Do you need help preparing meals?  No   Do you need help with housework?  No   Do you need help with laundry? No   Do you need help taking your medications? No   Do you need help managing money? No   Do you ever drive or ride in a car without wearing a seat belt? No   Have  you felt unusual stress, anger or loneliness in the last month? No   Who do you live with? Alone   If you need help, do you have trouble finding someone available to you? No   Have you been bothered in the last four weeks by sexual problems? No   Do you have difficulty concentrating, remembering or making decisions? No           Age-appropriate Screening Schedule:  Refer to the list below for future screening recommendations based on patient's age, sex and/or medical conditions. Orders for these recommended tests are listed in the plan section. The patient has been provided with a written plan.    Health Maintenance List  Health Maintenance   Topic Date Due    Pneumococcal Vaccine 65+ (1 of 1 - PCV) 04/13/2016    DXA SCAN  01/02/2019    HEPATITIS C SCREENING  Never done    COVID-19 Vaccine (5 - 2023-24 season) 12/20/2024 (Originally 9/1/2024)    INFLUENZA VACCINE  03/31/2025 (Originally 8/1/2024)    ANNUAL WELLNESS VISIT  09/30/2025    BMI FOLLOWUP  09/30/2025    COLORECTAL CANCER SCREENING  10/18/2025    MAMMOGRAM  11/22/2025    TDAP/TD VACCINES (2 - Td or Tdap) 06/10/2032    ZOSTER VACCINE  Completed                                                                                                                                                CMS Preventative Services Quick Reference  Risk Factors Identified During Encounter  Immunizations Discussed/Encouraged: Influenza and COVID19  Dental Screening Recommended  Vision Screening Recommended    The above risks/problems have been discussed with the patient.  Pertinent information has been shared with the patient in the After Visit Summary.  An After Visit Summary and PPPS were made available to the patient.    Follow Up:   Next Medicare Wellness visit to be scheduled in 1 year.         Additional E&M Note during same encounter follows:  Patient has additional, significant, and separately identifiable condition(s)/problem(s) that require work above and beyond the  "Medicare Wellness Visit     Chief Complaint  Medicare Wellness-subsequent    Subjective   HPI  Pat is also being seen today for an annual adult preventative physical exam.     Review of Systems   Gastrointestinal:         Occasional heart burn   Neurological:         Intermittent right great toe numbness   All other systems reviewed and are negative.             Objective   Vital Signs:  /72   Pulse 59   Temp 97.1 °F (36.2 °C)   Resp 20   Ht 170.2 cm (67\")   Wt 75.8 kg (167 lb 3.2 oz)   SpO2 98%   BMI 26.19 kg/m²   Physical Exam  Constitutional:       General: She is not in acute distress.     Appearance: Normal appearance. She is not ill-appearing.   HENT:      Head: Normocephalic and atraumatic.      Right Ear: Tympanic membrane and ear canal normal.      Left Ear: Tympanic membrane and ear canal normal.      Nose: Nose normal.      Mouth/Throat:      Mouth: Mucous membranes are moist.      Pharynx: Oropharynx is clear. No posterior oropharyngeal erythema.   Eyes:      Extraocular Movements: Extraocular movements intact.      Conjunctiva/sclera: Conjunctivae normal.      Pupils: Pupils are equal, round, and reactive to light.   Cardiovascular:      Rate and Rhythm: Normal rate and regular rhythm.      Pulses: Normal pulses.      Heart sounds: Normal heart sounds.   Pulmonary:      Effort: Pulmonary effort is normal. No respiratory distress.      Breath sounds: Normal breath sounds.   Abdominal:      General: Abdomen is flat. Bowel sounds are normal.      Palpations: Abdomen is soft.      Tenderness: There is no abdominal tenderness.   Musculoskeletal:         General: Normal range of motion.      Cervical back: Normal range of motion and neck supple.   Lymphadenopathy:      Cervical: No cervical adenopathy.   Skin:     General: Skin is warm and dry.      Capillary Refill: Capillary refill takes less than 2 seconds.   Neurological:      General: No focal deficit present.      Mental Status: She is " alert and oriented to person, place, and time. Mental status is at baseline.      Cranial Nerves: No cranial nerve deficit.      Sensory: No sensory deficit.      Motor: No weakness.   Psychiatric:         Mood and Affect: Mood normal.         Behavior: Behavior normal.         Thought Content: Thought content normal.         Judgment: Judgment normal.         The following data was reviewed by: Aguilar Charlton MD on 09/30/2024:        Assessment and Plan Additional age appropriate preventative wellness advice topics were discussed during today's preventative wellness exam(some topics already addressed during AWV portion of the note above):    Physical Activity: Advised cardiovascular activity 150 minutes per week as tolerated. (example brisk walk for 30 minutes, 5 days a week).     Nutrition: Discussed nutrition plan with patient. Information shared in after visit summary. Goal is for a well balanced diet to enhance overall health.     Injury Prevention Discussion:  Information shared in after visit summary.                    Medicare annual wellness visit, subsequent    Annual physical exam    Screening for diabetes mellitus    Screening for cholesterol level    Family history of diabetes mellitus    Gastroesophageal reflux disease without esophagitis      Orders Placed This Encounter   Procedures    Lipid Panel     Order Specific Question:   Release to patient     Answer:   Routine Release [[id number]]    Glucose, Random     Order Specific Question:   Release to patient     Answer:   Routine Release [[id number]]    Hemoglobin A1c     Order Specific Question:   Release to patient     Answer:   Routine Release [[id number]]     New Medications Ordered This Visit   Medications    pantoprazole (PROTONIX) 40 MG EC tablet     Sig: Take 1 tablet by mouth Daily.     Dispense:  30 tablet     Refill:  11          Follow Up   Return in about 1 year (around 9/30/2025) for Medicare Wellness, Annual.  Patient was given  instructions and counseling regarding her condition or for health maintenance advice. Please see specific information pulled into the AVS if appropriate.   yes

## 2024-10-01 LAB
CHOLEST SERPL-MCNC: 161 MG/DL (ref 0–200)
GLUCOSE SERPL-MCNC: 93 MG/DL (ref 65–99)
HBA1C MFR BLD: 5.3 % (ref 4.8–5.6)
HDLC SERPL-MCNC: 46 MG/DL (ref 40–60)
LDLC SERPL CALC-MCNC: 93 MG/DL (ref 0–100)
TRIGL SERPL-MCNC: 123 MG/DL (ref 0–150)
VLDLC SERPL CALC-MCNC: 22 MG/DL (ref 5–40)

## 2024-12-19 ENCOUNTER — HOSPITAL ENCOUNTER (OUTPATIENT)
Dept: HOSPITAL 22 - OUTP | Age: 74
Discharge: HOME | End: 2024-12-19
Payer: MEDICARE

## 2024-12-19 VITALS
HEART RATE: 60 BPM | DIASTOLIC BLOOD PRESSURE: 56 MMHG | RESPIRATION RATE: 16 BRPM | OXYGEN SATURATION: 98 % | SYSTOLIC BLOOD PRESSURE: 119 MMHG

## 2024-12-19 VITALS
HEART RATE: 62 BPM | DIASTOLIC BLOOD PRESSURE: 75 MMHG | OXYGEN SATURATION: 99 % | RESPIRATION RATE: 16 BRPM | SYSTOLIC BLOOD PRESSURE: 128 MMHG | TEMPERATURE: 98 F

## 2024-12-19 VITALS
RESPIRATION RATE: 16 BRPM | OXYGEN SATURATION: 94 % | HEART RATE: 62 BPM | TEMPERATURE: 97.52 F | SYSTOLIC BLOOD PRESSURE: 108 MMHG | DIASTOLIC BLOOD PRESSURE: 60 MMHG

## 2024-12-19 VITALS
RESPIRATION RATE: 18 BRPM | DIASTOLIC BLOOD PRESSURE: 66 MMHG | HEART RATE: 55 BPM | SYSTOLIC BLOOD PRESSURE: 131 MMHG | OXYGEN SATURATION: 97 %

## 2024-12-19 VITALS
SYSTOLIC BLOOD PRESSURE: 128 MMHG | HEART RATE: 58 BPM | DIASTOLIC BLOOD PRESSURE: 65 MMHG | RESPIRATION RATE: 18 BRPM | OXYGEN SATURATION: 99 %

## 2024-12-19 VITALS — BODY MASS INDEX: 25 KG/M2

## 2024-12-19 VITALS — OXYGEN SATURATION: 99 %

## 2024-12-19 DIAGNOSIS — K21.9: Primary | ICD-10-CM

## 2024-12-19 DIAGNOSIS — K31.9: ICD-10-CM

## 2024-12-19 DIAGNOSIS — R93.3: ICD-10-CM

## 2024-12-19 DIAGNOSIS — R10.13: ICD-10-CM

## 2024-12-19 PROCEDURE — 0DJ08ZZ INSPECTION OF UPPER INTESTINAL TRACT, VIA NATURAL OR ARTIFICIAL OPENING ENDOSCOPIC: ICD-10-PCS | Performed by: INTERNAL MEDICINE

## 2024-12-19 PROCEDURE — 43239 EGD BIOPSY SINGLE/MULTIPLE: CPT

## 2024-12-19 RX ADMIN — SODIUM CHLORIDE, SODIUM LACTATE, POTASSIUM CHLORIDE, AND CALCIUM CHLORIDE 25 ML: 600; 310; 30; 20 INJECTION, SOLUTION INTRAVENOUS at 09:37

## 2024-12-19 NOTE — EXP.ANES.CKL
[facility]
Disclaimer: 
The information contained in this section may have been updated after the patient was seen, as this information can be updated by other users.

Medical History (Reviewed 12/19/24 @ 09:26 by Cristal Jiang)

GERD (gastroesophageal reflux disease)


Surgical History (Reviewed 12/19/24 @ 09:26 by Cristal Jiang)

History of hysterectomy
H/O foot surgery


Family History (Reviewed 12/19/24 @ 09:26 by Cristal Jiang)
Mother
 Pacemaker
Mother
 GERD (gastroesophageal reflux disease)
Father
 Type 2 diabetes mellitus


Social History (Reviewed 12/19/24 @ 09:26 by Cristal Jiang)
Smoking Status:  Never smoker 
alcohol intake:  never 
substance use type:  denies use 
current occupational status:  retired 
Travel in the last 8 weeks:  None 



[facility] Anesthesia Checklist
Patient Identification
Patient Identification: Arm Band
Structural Data
Admitted From: Home
Planned Operative Procedure/s: EGD
Consent for Planned Operative Procedure(s) Verified: Yes
Verified Documents: Surgical Consent and History and Physical
NPO Status Verified
Time NPO: 00:00
Additional verifications
Anesthesia Reactions: No
Airway Assessment
Mallampati Score:: Class II
C-Spine Mobility Assessed: Yes
TMJ Mobility Assessed: Yes
Dentition: Good Dentition
Neurological Assessment
Level of Consciousness: Awake, Alert and Appropriate
Anesthesia Plan
Anesthesia Risk discussed: Yes
Anesthesia Plan: Verified
ASA Class: II
Anesthesia Type: MAC

## 2024-12-19 NOTE — P.HP_ITS
History of Present Illness    
*Admission Date: 12/19/24    
*History of present illness:     
Mrs. Duvall is a 74-year-old female who is here for diagnostic upper endoscopy.   
She was in the emergency department in January 2023 with acute dyspepsia and had  
a CAT scan that showed some evidence of more severe gastritis.  She has never   
had an EGD.  She does have chronic uncomplicated GERD and takes Protonix.  The   
examination is deemed medically necessary for EGD.  The patient has been seen,   
interviewed and examined prior to the procedure by both myself and the   
anesthesia provider.    
    
[facility]    
Disclaimer:     
The information contained in this section may have been updated after the   
patient was seen, as this information can be updated by other users.    
    
Medical History (Updated 12/19/24 @ 11:00 by Tremaine El II, MD)    
    
GERD (gastroesophageal reflux disease)    
    
    
Surgical History (Reviewed 12/19/24 @ 09:26 by Cristal Jiang)    
    
History of hysterectomy    
H/O foot surgery    
    
    
Family History (Reviewed 12/19/24 @ 09:26 by Cristal Jiang)    
Mother   Pacemaker    
Mother   GERD (gastroesophageal reflux disease)    
Father   Type 2 diabetes mellitus    
    
    
Social History (Updated 12/19/24 @ 09:50 by Josh Huddleston CRNA)    
Smoking Status:  Never smoker     
alcohol intake:  never     
substance use type:  denies use     
current occupational status:  retired     
Travel in the last 8 weeks:  None     
Have you lived/traveled outside [place] in past 30 days?:  No     
Contact w/someone who lives/traveled outside US past 30 days?:  No     
Exposure to someone with infectious disease in past 14 days?:  No     
Do you have a fever (greater than 100.4 F or 38 C)?:  No     
Have you tested positive for COVID-19:  No     
Exposed to someone with COVID-19 in past 14 days?:  No     
Do you have a sore throat?:  No     
Do you have a cough?:  No     
Do you have any weakness?:  No     
Are you experiencing any nausea/vomitting?:  No     
Do you have any diarrhea?:  No     
Are you experiencing any unusual bleeding?:  No     
Do you have any muscle aches/pain?:  No     
Do you have any abdominal pain?:  No     
Are you experiencing loss of taste or smell?:  No     
    
    
    
Review of Systems    
Review of Systems    
Review of systems (narrative):     
Negative    
*Cardiovascular    
Comments:     
Negative    
*Gastrointestinal    
Comments:     
Negative    
*Genitourinary    
Comments:     
Negative    
*Musculoskeletal    
Comments:     
Negative    
*Neurologic    
Comments:     
Negative    
    
Meds    
Home Medications and Allergies    
                                Home Medications    
    
    
    
?Medication ?Instructions ?Recorded ?Confirmed ?Type    
     
ondansetron 4 mg disintegrating 4 mg PO Q8H PRN nausea and 01/03/23 12/19/24 Rx    
    
tablet vomiting #10 tabs       
     
pantoprazole 40 mg granules 40 mg PO DAILY #30 ea 01/03/23 12/19/24 Rx    
    
delayed-release for susp in packet        
    
(Protonix)        
    
    
                           New Prescriptions to Start    
    
Prescriptions:      
    
    
                                    Allergies    
    
    
    
Allergy/AdvReac Type Severity Reaction Status Date / Time    
     
No Known Allergies Allergy   Verified 12/17/24 12:57    
    
    
    
    
Exam    
Data for Last 24 hours    
Vital signs and Labs for Last 24 Hours:     
    
                                            
    
    
    
Temp Pulse Resp BP Pulse Ox O2 Del Method    
     
 98 F   62   16   128/75   99   Room Air    
     
 12/19/24 09:27  12/19/24 09:27  12/19/24 09:27  12/19/24 09:27  12/19/24 09:27   
12/19/24 09:27    
    
    
    
    
I & O for Last 24 hours:     
    
                                 Intake & Output    
    
    
    
 12/16/24 12/17/24 12/18/24 12/19/24    
    
 23:59 23:59 23:59 23:59    
     
Weight  160 lb      
    
    
    
    
*Routine HEENT Exam    
Head: Present normocephalic    
Eye: Present EOMI and PERRL    
ENT: Present mucous membranes moist    
*Routine Neck Exam    
Neck: Present supple    
*Routine Respiratory Exam    
Respiratory: Present CTA bilaterally    
*Routine Cardiovascular Exam    
Cardiovascular: Present RRR    
*Routine Abdominal Exam    
Abdominal: Present soft and normoactive bowel sounds; Absent tenderness    
*Routine Rectal Exam    
Rectal:: deferred    
*Routine Genitalia Exam    
Genitalia:: deferred    
*Routine Extremities Exam    
Extremities: Absent cyanosis, clubbing or edema    
*Routine Skin Exam    
Skin: Present warm; Absent rash    
*Routine Neurological Exam    
Neurological: Present alert and oriented X3    
    
Assessment and Plan    
*Assessment and plan    
(1) Chronic GERD:     
      Status: Acute    
      Category: Medical    
      Code(s):    
K21.9 - Gastro-esophageal reflux disease without esophagitis    
(2) Abnormal CT scan, gastrointestinal tract:     
      Status: Acute    
      Category: Medical    
      Code(s):    
R93.3 - Abnormal findings on diagnostic imaging of other parts of digestive   
tract    
    
Plan    
A/P: 1.  Chronic GERD with abnormal CAT scan of stomach is the preprocedural   
diagnosis.  The patient will be anesthetized/sedated using MAC sedation.  The   
patient has been seen and examined.  Cardiac and lung assessment prior to the   
examination is stable.  Proceed with planned EGD

## 2024-12-19 NOTE — EXP.HP
History of Present Illness
*Admission Date: 12/19/24
*History of present illness: 
Mrs. Duvall is a 74-year-old female who is here for diagnostic upper endoscopy.  She was in the emergency department in January 2023 with acute dyspepsia and had a CAT scan that showed some evidence of more severe gastritis.  She has never had an 
EGD.  She does have chronic uncomplicated GERD and takes Protonix.  The examination is deemed medically necessary for EGD.  The patient has been seen, interviewed and examined prior to the procedure by both myself and the anesthesia provider.

[facility]
Disclaimer: 
The information contained in this section may have been updated after the patient was seen, as this information can be updated by other users.

Medical History (Updated 12/19/24 @ 11:00 by Tremaine El II, MD)

GERD (gastroesophageal reflux disease)


Surgical History (Reviewed 12/19/24 @ 09:26 by Cristal Jiang)

History of hysterectomy
H/O foot surgery


Family History (Reviewed 12/19/24 @ 09:26 by Cristal Jiang)
Mother
 Pacemaker
Mother
 GERD (gastroesophageal reflux disease)
Father
 Type 2 diabetes mellitus


Social History (Updated 12/19/24 @ 09:50 by Josh Huddleston CRNA)
Smoking Status:  Never smoker 
alcohol intake:  never 
substance use type:  denies use 
current occupational status:  retired 
Travel in the last 8 weeks:  None 
Have you lived/traveled outside [place] in past 30 days?:  No 
Contact w/someone who lives/traveled outside [place] past 30 days?:  No 
Exposure to someone with infectious disease in past 14 days?:  No 
Do you have a fever (greater than 100.4 F or 38 C)?:  No 
Have you tested positive for COVID-19:  No 
Exposed to someone with COVID-19 in past 14 days?:  No 
Do you have a sore throat?:  No 
Do you have a cough?:  No 
Do you have any weakness?:  No 
Are you experiencing any nausea/vomitting?:  No 
Do you have any diarrhea?:  No 
Are you experiencing any unusual bleeding?:  No 
Do you have any muscle aches/pain?:  No 
Do you have any abdominal pain?:  No 
Are you experiencing loss of taste or smell?:  No 



Review of Systems
Review of Systems
Review of systems (narrative): 
Negative
*Cardiovascular
Comments: 
Negative
*Gastrointestinal
Comments: 
Negative
*Genitourinary
Comments: 
Negative
*Musculoskeletal
Comments: 
Negative
*Neurologic
Comments: 
Negative

Meds
Home Medications and Allergies
Home Medications

?Medication ?Instructions ?Recorded ?Confirmed ?Type
ondansetron 4 mg disintegrating 4 mg PO Q8H PRN nausea and 01/03/23 12/19/24 Rx
tablet vomiting #10 tabs   
pantoprazole 40 mg granules 40 mg PO DAILY #30 ea 01/03/23 12/19/24 Rx
delayed-release for susp in packet    
(Protonix)    

New Prescriptions to Start

Prescriptions:  


Allergies

Allergy/AdvReac Type Severity Reaction Status Date / Time
No Known Allergies Allergy   Verified 12/17/24 12:57



Exam
Data for Last 24 hours
Vital signs and Labs for Last 24 Hours: 



Temp Pulse Resp BP Pulse Ox O2 Del Method
 98 F   62   16   128/75   99   Room Air
 12/19/24 09:27  12/19/24 09:27  12/19/24 09:27  12/19/24 09:27  12/19/24 09:27  12/19/24 09:27



I & O for Last 24 hours: 

Intake & Output

 12/16/24 12/17/24 12/18/24 12/19/24
 23:59 23:59 23:59 23:59
Weight  160 lb  



*Routine HEENT Exam
Head: Present normocephalic
Eye: Present EOMI and PERRL
ENT: Present mucous membranes moist
*Routine Neck Exam
Neck: Present supple
*Routine Respiratory Exam
Respiratory: Present CTA bilaterally
*Routine Cardiovascular Exam
Cardiovascular: Present RRR
*Routine Abdominal Exam
Abdominal: Present soft and normoactive bowel sounds; Absent tenderness
*Routine Rectal Exam
Rectal:: deferred
*Routine Genitalia Exam
Genitalia:: deferred
*Routine Extremities Exam
Extremities: Absent cyanosis, clubbing or edema
*Routine Skin Exam
Skin: Present warm; Absent rash
*Routine Neurological Exam
Neurological: Present alert and oriented X3

Assessment and Plan
*Assessment and plan
(1) Chronic GERD: 
      Status: Acute
      Category: Medical
      Code(s):
K21.9 - Gastro-esophageal reflux disease without esophagitis
(2) Abnormal CT scan, gastrointestinal tract: 
      Status: Acute
      Category: Medical
      Code(s):
R93.3 - Abnormal findings on diagnostic imaging of other parts of digestive tract

Plan
A/P: 1.  Chronic GERD with abnormal CAT scan of stomach is the preprocedural diagnosis.  The patient will be anesthetized/sedated using MAC sedation.  The patient has been seen and examined.  Cardiac and lung assessment prior to the examination is 
stable.  Proceed with planned EGD

## 2024-12-19 NOTE — P.PCN_ITS
[facility] Procedure Note    
Date: 12/19/24    
Time: 11:04    
Procedure Note::     
Upper Endoscopy Procedure Report: Esophagogastroduodenoscopy with cold biopsies    
    
Endoscopost: Tremaine El II, MD    
    
Referring Physician: Vicky REZA/Faisal Quinn MD    
    
Date of Procedure: December 19, 2024    
    
Equipment: Olympus  standard upper endoscope    
    
Sedation: MAC sedation    
    
Indications: Mrs. Duvall is a 74-year-old female who is here for diagnostic   
colonoscopy.  The patient did go to the emergency department on 1/3/2023 with   
acute dyspepsia and epigastric abdominal pain and discomfort.  At that time, she  
had a mildly elevated white blood cell count. Her CAT scan at that time showed   
severe wall thickening of the distal stomach that I suspect gastritis.  She also  
had a gallbladder ultrasound and there was no gallbladder wall thickening   
distention or stones.  She did have several benign liver cysts.  The patient is   
on pantoprazole for her chronic GERD which she has had for years.  This is mild   
and intermittent and often occurs at nighttime.  She reports no abdominal pain,   
indigestion, nausea or dysphagia.  She has regular bowel function.  She had a   
negative Cologuard last year.    
    
Procedure:     
Prior to the procedure, a history and physical exam was performed, and patient's  
medications and allergies were reviewed.  The risks, benefits and alternatives   
of the sedation and procedure were discussed with the patient.  All questions   
were answered and informed consent was obtained.  The patient was brought to the  
procedure room.  Patient identification and proposed procedure were verified by   
the physician and the nurse.  The patient was placed in a left lateral decubitus  
position and the scope was passed under direct vision.  Throughout the   
procedure, the patient's blood pressure, pulse, and oxygen saturations were   
monitored continuously.  The upper GI endoscopy was accomplished without   
difficulty.  The patient tolerated the procedure well.    
    
Findings:  The scope was passed directly into the upper esophagus and advanced   
to the third portion of the duodenum. The post bulbar duodenum and duodenal bulb  
were normal with normal mucosa and conniventes. The scope was withdrawn through   
a normal duodenal bulb and pylorus into the stomach.  There was bile reflux with  
mild linear reactive gastropathy of the prepyloric antrum.  The remainder of the  
body and fundus of the stomach were normal.  Upon retroflexion there was no   
hiatal hernia.  Biopsies were taken from the antrum.  The scope was then wi  
thdrawn into the esophagus.  There was no evidence of reflux esophagitis or   
Snyder's.  The remainder of the esophageal mucosa was normal.    
    
Impression:     
1.  Nonerosive GERD    
2.  Bile reflux with mild linear reactive gastropathy of antrum    
    
Plan: I will follow-up the biopsies.  The patient does have uncomplicated GERD.   
Uncomplicated GERD (gastro-esophageal reflux disease) is primarily defined as   
heartburn and reflux symptoms that occur intermittently and at the time of   
endoscopy there are no breaks in the esophageal lining caused by esophageal   
reflux.  This can be controlled by dietary measures, short-term antacids or   
short-term acid reflux therapy.  Complicated GERD is defined by having more   
advanced changes with ulceration, erosion or damage to the esophagus and/or with  
the presence of Snyder's esophagus.  The patient does not have complicated   
GERD.

## 2024-12-19 NOTE — P.PNANES_ITS
[facility]    
Disclaimer:     
The information contained in this section may have been updated after the   
patient was seen, as this information can be updated by other users.    
    
Medical History (Reviewed 12/19/24 @ 09:26 by Cristal Jiang)    
    
GERD (gastroesophageal reflux disease)    
    
    
Surgical History (Reviewed 12/19/24 @ 09:26 by Cristal Jiang)    
    
History of hysterectomy    
H/O foot surgery    
    
    
Family History (Reviewed 12/19/24 @ 09:26 by Cristal Jiang)    
Mother   Pacemaker    
Mother   GERD (gastroesophageal reflux disease)    
Father   Type 2 diabetes mellitus    
    
    
Social History (Reviewed 12/19/24 @ 09:26 by Cristal Jiang)    
Smoking Status:  Never smoker     
alcohol intake:  never     
substance use type:  denies use     
current occupational status:  retired     
Travel in the last 8 weeks:  None     
    
    
    
[facility] Anesthesia Checklist    
Patient Identification    
Patient Identification: Arm Band    
Structural Data    
Admitted From: Home    
Planned Operative Procedure/s: EGD    
Consent for Planned Operative Procedure(s) Verified: Yes    
Verified Documents: Surgical Consent and History and Physical    
NPO Status Verified    
Time NPO: 00:00    
Additional verifications    
Anesthesia Reactions: No    
Airway Assessment    
Mallampati Score:: Class II    
C-Spine Mobility Assessed: Yes    
TMJ Mobility Assessed: Yes    
Dentition: Good Dentition    
Neurological Assessment    
Level of Consciousness: Awake, Alert and Appropriate    
Anesthesia Plan    
Anesthesia Risk discussed: Yes    
Anesthesia Plan: Verified    
ASA Class: II    
Anesthesia Type: MAC

## 2024-12-19 NOTE — HMH.PROCNOTE
[facility] Procedure Note
Date: 12/19/24
Time: 11:04
Procedure Note:: 
Upper Endoscopy Procedure Report: Esophagogastroduodenoscopy with cold biopsies

Endoscopost: Tremaine El II, MD

Referring Physician: Vicky REZA/Faisal Quinn MD

Date of Procedure: December 19, 2024

Equipment: Olympus  standard upper endoscope

Sedation: MAC sedation

Indications: Mrs. Duvall is a 74-year-old female who is here for diagnostic colonoscopy.  The patient did go to the emergency department on 1/3/2023 with acute dyspepsia and epigastric abdominal pain and discomfort.  At that time, she had a mildly 
elevated white blood cell count. Her CAT scan at that time showed severe wall thickening of the distal stomach that I suspect gastritis.  She also had a gallbladder ultrasound and there was no gallbladder wall thickening distention or stones.  She 
did have several benign liver cysts.  The patient is on pantoprazole for her chronic GERD which she has had for years.  This is mild and intermittent and often occurs at nighttime.  She reports no abdominal pain, indigestion, nausea or dysphagia.  
She has regular bowel function.  She had a negative Cologuard last year.

Procedure: 
Prior to the procedure, a history and physical exam was performed, and patient's medications and allergies were reviewed.  The risks, benefits and alternatives of the sedation and procedure were discussed with the patient.  All questions were 
answered and informed consent was obtained.  The patient was brought to the procedure room.  Patient identification and proposed procedure were verified by the physician and the nurse.  The patient was placed in a left lateral decubitus position and 
the scope was passed under direct vision.  Throughout the procedure, the patient's blood pressure, pulse, and oxygen saturations were monitored continuously.  The upper GI endoscopy was accomplished without difficulty.  The patient tolerated the 
procedure well.

Findings:  The scope was passed directly into the upper esophagus and advanced to the third portion of the duodenum. The post bulbar duodenum and duodenal bulb were normal with normal mucosa and conniventes. The scope was withdrawn through a normal 
duodenal bulb and pylorus into the stomach.  There was bile reflux with mild linear reactive gastropathy of the prepyloric antrum.  The remainder of the body and fundus of the stomach were normal.  Upon retroflexion there was no hiatal hernia.  
Biopsies were taken from the antrum.  The scope was then withdrawn into the esophagus.  There was no evidence of reflux esophagitis or Snyder's.  The remainder of the esophageal mucosa was normal.

Impression: 
1.  Nonerosive GERD
2.  Bile reflux with mild linear reactive gastropathy of antrum

Plan: I will follow-up the biopsies.  The patient does have uncomplicated GERD.  Uncomplicated GERD (gastro-esophageal reflux disease) is primarily defined as heartburn and reflux symptoms that occur intermittently and at the time of endoscopy there 
are no breaks in the esophageal lining caused by esophageal reflux.  This can be controlled by dietary measures, short-term antacids or short-term acid reflux therapy.  Complicated GERD is defined by having more advanced changes with ulceration, 
erosion or damage to the esophagus and/or with the presence of Snyder's esophagus.  The patient does not have complicated GERD.